# Patient Record
Sex: FEMALE | Race: BLACK OR AFRICAN AMERICAN | NOT HISPANIC OR LATINO
[De-identification: names, ages, dates, MRNs, and addresses within clinical notes are randomized per-mention and may not be internally consistent; named-entity substitution may affect disease eponyms.]

---

## 2021-10-05 PROBLEM — Z00.00 ENCOUNTER FOR PREVENTIVE HEALTH EXAMINATION: Status: ACTIVE | Noted: 2021-10-05

## 2021-11-04 ENCOUNTER — APPOINTMENT (OUTPATIENT)
Dept: OTOLARYNGOLOGY | Facility: CLINIC | Age: 46
End: 2021-11-04
Payer: MEDICARE

## 2021-11-04 VITALS
WEIGHT: 240 LBS | DIASTOLIC BLOOD PRESSURE: 88 MMHG | HEIGHT: 62 IN | TEMPERATURE: 95.6 F | HEART RATE: 86 BPM | SYSTOLIC BLOOD PRESSURE: 138 MMHG | BODY MASS INDEX: 44.16 KG/M2

## 2021-11-04 DIAGNOSIS — K21.00 GASTRO-ESOPHAGEAL REFLUX DISEASE WITH ESOPHAGITIS, WITHOUT BLEEDING: ICD-10-CM

## 2021-11-04 DIAGNOSIS — Z82.5 FAMILY HISTORY OF ASTHMA AND OTHER CHRONIC LOWER RESPIRATORY DISEASES: ICD-10-CM

## 2021-11-04 DIAGNOSIS — J35.1 HYPERTROPHY OF TONSILS: ICD-10-CM

## 2021-11-04 DIAGNOSIS — Z87.891 PERSONAL HISTORY OF NICOTINE DEPENDENCE: ICD-10-CM

## 2021-11-04 DIAGNOSIS — Z78.9 OTHER SPECIFIED HEALTH STATUS: ICD-10-CM

## 2021-11-04 DIAGNOSIS — J03.91 ACUTE RECURRENT TONSILLITIS, UNSPECIFIED: ICD-10-CM

## 2021-11-04 DIAGNOSIS — Z82.49 FAMILY HISTORY OF ISCHEMIC HEART DISEASE AND OTHER DISEASES OF THE CIRCULATORY SYSTEM: ICD-10-CM

## 2021-11-04 DIAGNOSIS — G47.33 OBSTRUCTIVE SLEEP APNEA (ADULT) (PEDIATRIC): ICD-10-CM

## 2021-11-04 DIAGNOSIS — R06.5 MOUTH BREATHING: ICD-10-CM

## 2021-11-04 DIAGNOSIS — Z87.09 PERSONAL HISTORY OF OTHER DISEASES OF THE RESPIRATORY SYSTEM: ICD-10-CM

## 2021-11-04 DIAGNOSIS — Z87.898 PERSONAL HISTORY OF OTHER SPECIFIED CONDITIONS: ICD-10-CM

## 2021-11-04 DIAGNOSIS — Z72.89 OTHER PROBLEMS RELATED TO LIFESTYLE: ICD-10-CM

## 2021-11-04 PROCEDURE — 31575 DIAGNOSTIC LARYNGOSCOPY: CPT

## 2021-11-04 PROCEDURE — 99204 OFFICE O/P NEW MOD 45 MIN: CPT | Mod: 25

## 2021-11-04 RX ORDER — FLUTICASONE PROPIONATE 110 UG/1
110 AEROSOL, METERED RESPIRATORY (INHALATION)
Refills: 0 | Status: ACTIVE | COMMUNITY

## 2021-11-04 RX ORDER — IBUPROFEN 200 MG
0.05 TABLET ORAL
Refills: 0 | Status: ACTIVE | COMMUNITY

## 2021-11-04 RX ORDER — ALBUTEROL SULFATE 90 UG/1
108 AEROSOL, METERED RESPIRATORY (INHALATION)
Refills: 0 | Status: ACTIVE | COMMUNITY

## 2021-12-15 PROBLEM — Z82.49 FAMILY HISTORY OF HYPERTENSION: Status: ACTIVE | Noted: 2021-12-15

## 2021-12-15 PROBLEM — Z82.5 FAMILY HISTORY OF ASTHMA: Status: ACTIVE | Noted: 2021-12-15

## 2021-12-15 PROBLEM — Z87.09 HISTORY OF ASTHMA: Status: RESOLVED | Noted: 2021-12-15 | Resolved: 2021-12-15

## 2021-12-21 PROBLEM — Z87.898 HISTORY OF HEARTBURN: Status: RESOLVED | Noted: 2021-12-15 | Resolved: 2021-12-21

## 2021-12-21 PROBLEM — J35.1 PALATINE TONSIL HYPERTROPHY: Status: ACTIVE | Noted: 2021-12-21

## 2021-12-21 PROBLEM — R06.5 MOUTH BREATHING: Status: RESOLVED | Noted: 2021-12-21 | Resolved: 2021-12-21

## 2021-12-21 PROBLEM — G47.33 OBSTRUCTIVE SLEEP APNEA: Status: ACTIVE | Noted: 2021-12-21

## 2021-12-21 PROBLEM — K21.00 CHRONIC REFLUX ESOPHAGITIS: Status: ACTIVE | Noted: 2021-12-21

## 2021-12-21 RX ORDER — OMEPRAZOLE 20 MG/1
20 CAPSULE, DELAYED RELEASE ORAL
Refills: 0 | Status: ACTIVE | COMMUNITY

## 2021-12-21 RX ORDER — MULTIVITAMIN
CAPSULE ORAL
Refills: 0 | Status: ACTIVE | COMMUNITY

## 2021-12-21 NOTE — CONSULT LETTER
[Dear  ___] : Dear  [unfilled], [( Thank you for referring [unfilled] for consultation for _____ )] : Thank you for referring [unfilled] for consultation for [unfilled] [Please see my note below.] : Please see my note below. [Consult Closing:] : Thank you very much for allowing me to participate in the care of this patient.  If you have any questions, please do not hesitate to contact me. [Sincerely,] : Sincerely, [FreeTextEntry2] : Dr. Becerra\par [address]\par [place] [id number] [FreeTextEntry3] : \par Queenie Hathaway MD \par Otolaryngology, Head and Neck Surgery \par \par

## 2021-12-21 NOTE — PROCEDURE
[de-identified] : \par Indication:   reflux\par -Verbal consent was obtained from patient prior to procedure.\par Flexible laryngoscopy was performed via mouth and revealed the following:\par   -- Large palatine tonsils \par   -- Base of tongue was symmetric and not enlarged.\par   -- Vallecula was clear\par   -- Epiglottis, both aryepiglottic folds and both false vocal folds were normal\par   -- Arytenoids both with mild edema and no erythema \par   -- True vocal folds were fully mobile and without lesions. \par   -- Post cricoid area was clear.\par   -- Interarytenoid edema was present\par \par The patient tolerated the procedure well.\par

## 2021-12-21 NOTE — HISTORY OF PRESENT ILLNESS
[de-identified] : Ms. RAY is a 46 year old woman who was referred by Dr. Becerra for evaluation.\par She had throat pain and cervical lymph node swelling from February to March 2021. Sx resolved after antibiotics and mouthwash.  The sx occurred again in July, and sx again resolved after antibiotics.  Negative throat cultures for strep throat.  Throat pain and neck gland swelling chronic intermittent since 2018 but no antibiotics until this year.\par She had ear pain during one episode for which she received ear drops.  No labs or scans done.\par Currently no throat pain, neck swelling, dysphagia or voice change.\par On omeprazole for reflux.\par She usually breathes through her mouth.  Hx of AIRAM, no CPAP.\par

## 2021-12-21 NOTE — ASSESSMENT
[FreeTextEntry1] : Ms. RAY was evaluated for recurrent tonsillitis.  She had two episodes in 2021, none in 2020, and chronic intermittent throat pain in 2018 and 2019.  Tonsils are 3+ and normal today.\par -->  I do not recommend tonsillectomy at this time. If tonsillitis recurs more often, then we can discuss tonsillectomy. \par \par She has snoring and possible AIRAM, likely due to tonsil size.  \par We discussed sleep study.  She will use phone cuauhtemoc for sleep to see if she has pauses or apneas first.\par \par Frequent heartburn/reflux\par --> reflux precaution reviewed with her\par \par Return if repeat infections occur

## 2021-12-21 NOTE — PHYSICAL EXAM
[Midline] : trachea located in midline position [Normal] : palatine tonsils are normal [Laryngoscopy Performed] : laryngoscopy was performed, see procedure section for findings [FreeTextEntry1] : No hoarseness.  [de-identified] : shotty lymph nodes in left level 2, nontender [de-identified] : 3+ bilateral  [de-identified] : Carotid pulses 2+ bilateral.  [de-identified] : see NECK

## 2021-12-21 NOTE — REVIEW OF SYSTEMS
[Patient Intake Form Reviewed] : Patient intake form was reviewed [Seasonal Allergies] : seasonal allergies [Ear Pain] : ear pain [Dry Eyes] : dry eyes [Eyes Itch] : itching of the eyes [Negative] : Heme/Lymph [As Noted in HPI] : as noted in HPI [FreeTextEntry2] : sleep apnea [FreeTextEntry6] : asthma

## 2022-10-12 ENCOUNTER — APPOINTMENT (OUTPATIENT)
Dept: OTOLARYNGOLOGY | Facility: CLINIC | Age: 47
End: 2022-10-12

## 2022-12-08 ENCOUNTER — APPOINTMENT (OUTPATIENT)
Dept: OTOLARYNGOLOGY | Facility: CLINIC | Age: 47
End: 2022-12-08